# Patient Record
(demographics unavailable — no encounter records)

---

## 2025-04-03 NOTE — RISK ASSESSMENT
[Clinical Interview] : Clinical Interview [Collateral Sources] : Collateral Sources [In last 30 days] : in the last 30 days [ADHD] : ADHD [Triggering events leading to humiliation, shame, and/or despair] : triggering events leading to humiliation, shame, and/or despair (e.g. loss of relationship, financial or health status) (real or anticipated) [Identifies reasons for living] : identifies reasons for living [Supportive social network of family or friends] : supportive social network of family or friends [Ability to cope with stress] : ability to cope with stress [Responsibility to children, family, or others] : responsibility to children, family, or others [Engaged in work or school] : engaged in work or school [None in the patient's lifetime] : None in the patient's lifetime [None Known] : none known [No] : no [No known risk factors] : No known risk factors [Residential stability] : residential stability [Relationship stability] : relationship stability [Affective stability] : affective stability [Sobriety] : sobriety [Engagement in treatment] : engagement in treatment [Yes] : yes [FreeTextEntry6] : see scanned safety plan.  [de-identified] : no access to either reported.

## 2025-04-03 NOTE — RISK ASSESSMENT
[Clinical Interview] : Clinical Interview [Collateral Sources] : Collateral Sources [In last 30 days] : in the last 30 days [ADHD] : ADHD [Triggering events leading to humiliation, shame, and/or despair] : triggering events leading to humiliation, shame, and/or despair (e.g. loss of relationship, financial or health status) (real or anticipated) [Identifies reasons for living] : identifies reasons for living [Supportive social network of family or friends] : supportive social network of family or friends [Ability to cope with stress] : ability to cope with stress [Responsibility to children, family, or others] : responsibility to children, family, or others [Engaged in work or school] : engaged in work or school [None in the patient's lifetime] : None in the patient's lifetime [None Known] : none known [No] : no [No known risk factors] : No known risk factors [Residential stability] : residential stability [Relationship stability] : relationship stability [Affective stability] : affective stability [Sobriety] : sobriety [Engagement in treatment] : engagement in treatment [Yes] : yes [FreeTextEntry6] : see scanned safety plan.  [de-identified] : no access to either reported.

## 2025-04-03 NOTE — DISCUSSION/SUMMARY
[Low acute suicide risk] : Low acute suicide risk [No] : No [Yes] : Safety Plan completed/updated (for individuals at risk): Yes [FreeTextEntry1] : Risk factors: recent passive suicidal thoughts, depressive symptoms, anxiety symptoms, family history of psychiatric illness.   Protective factors: female gender, domiciled with supportive family, engaged in school, no prior SA or SIB, not current si/i/p, no h/o violence, no current hi/i/p, help-seeking, motivated for outpatient treatment, future oriented with short and long term goals, barriers to suicide, no access to guns, not acutely manic or psychotic, no substance abuse, no trauma hx, no family history of suicide. [FreeTextEntry3] : see scanned safety plan.

## 2025-04-03 NOTE — HISTORY OF PRESENT ILLNESS
[Not Applicable] : Not applicable [FreeTextEntry1] : Patient is a 13 year-old male, domiciled with parents and siblings, enrolled in [employer], in the 7th grade regular education, 504, with prior psychiatric history of ASD and ADHD, currently in outpatient treatment with pediatric neurologist, no h/o psychiatric hospitalizations, no h/o of self-injury or suicide attempts, no h/o aggression/violence/legal issues, no CPS involvement, no substance use, no known trauma hx, no significant pmhx, now presenting accompanied by his father as he was self-referred for help connecting to outpatient therapy services.   Patient presented as calm and cooperative while maintaining direct eye contact and presenting with an overall euthymic mood. Patient endorsed coming to [facility] after he shared with his parents the previous night that he was experiencing passive suicidal ideation. Patient reported that experiencing ideations have only ever occurred on a few occasions with the thoughts never being at a specific or active level. Patient reported that that worst of his thoughts includes not feeling enough for others or wanting to escape stressors. Patient endorsed that school stressors specifically have caused this most recent episode as he has been struggling to maintain the workload. Patient also endorsed inconsistent compliance with his medication regimen which he endorsed noticing a pattern that thoughts sometimes get worse when he is off his regimen. Patient endorsed wanting to speak with an outpatient therapist moving forward in addition to being more compliant with his medication regimen. Safety plan completed with patient using the [facility] Safety Plan", a copy was provided to the patient and encouraged to put it in an easily accessible place i.e. on a phone or bedside table.  The Safety Plan is a best practice recommendation by the Suicide Prevention Resource [facility].  The family was advised to call 911 or take the patient to the nearest ER if patient's behavior worsens or if any safety concerns arise.  Patient's father provided collateral. School consent was obtained and contacted regarding the outcome of today's evaluation. Father endorsed the patient's report above, reporting that the patient's current medication regimen of Zoloft and Methylphenidate is prescribed by his pediatric neurologist. Father was in agreement, stating that he needs to keep up with the patient's regimen as well so that he can remain compliant consistently. Father shared an extensive mental health family history and thus, understands the importance of compliance and pursuing additional services that might be needed. Father verbalized that the patient could benefit from utilizing more effective coping mechanisms and using a therapeutic outlet to reduce stress levels pertaining to academics and performance. He reports the patient's mood is relatively stable as evidenced by most being content/neutral despite being irritable when his stressors are high. Father denies any aggression or oppositional behavior. Father denies any symptoms of wilner or psychosis. He denied any current substance usage in the home. He denied any acute safety concerns at this time and denied any present concerns with the patient as it comes to SI/HI. [FreeTextEntry2] : ADHD, ASD.  [FreeTextEntry3] : Patient is currently prescribed Zoloft and Methylphenidate through his pediatric neurologist.

## 2025-04-03 NOTE — HISTORY OF PRESENT ILLNESS
[Not Applicable] : Not applicable [FreeTextEntry1] : Patient is a 13 year-old male, domiciled with parents and siblings, enrolled in [employer], in the 7th grade regular education, 504, with prior psychiatric history of ASD and ADHD, currently in outpatient treatment with pediatric neurologist, no h/o psychiatric hospitalizations, no h/o of self-injury or suicide attempts, no h/o aggression/violence/legal issues, no CPS involvement, no substance use, no known trauma hx, no significant pmhx, now presenting accompanied by his father as he was self-referred for help connecting to outpatient therapy services.   Patient presented as calm and cooperative while maintaining direct eye contact and presenting with an overall euthymic mood. Patient endorsed coming to [facility] after he shared with his parents the previous night that he was experiencing passive suicidal ideation. Patient reported that experiencing ideations have only ever occurred on a few occasions with the thoughts never being at a specific or active level. Patient reported that that worst of his thoughts includes not feeling enough for others or wanting to escape stressors. Patient endorsed that school stressors specifically have caused this most recent episode as he has been struggling to maintain the workload. Patient also endorsed inconsistent compliance with his medication regimen which he endorsed noticing a pattern that thoughts sometimes get worse when he is off his regimen. Patient endorsed wanting to speak with an outpatient therapist moving forward in addition to being more compliant with his medication regimen. Safety plan completed with patient using the [facility] Safety Plan", a copy was provided to the patient and encouraged to put it in an easily accessible place i.e. on a phone or bedside table.  The Safety Plan is a best practice recommendation by the [facility].  The family was advised to call 911 or take the patient to the nearest ER if patient's behavior worsens or if any safety concerns arise.  Patient's father provided collateral. School consent was obtained and contacted regarding the outcome of today's evaluation. Father endorsed the patient's report above, reporting that the patient's current medication regimen of Zoloft and Methylphenidate is prescribed by his pediatric neurologist. Father was in agreement, stating that he needs to keep up with the patient's regimen as well so that he can remain compliant consistently. Father shared an extensive mental health family history and thus, understands the importance of compliance and pursuing additional services that might be needed. Father verbalized that the patient could benefit from utilizing more effective coping mechanisms and using a therapeutic outlet to reduce stress levels pertaining to academics and performance. He reports the patient's mood is relatively stable as evidenced by most being content/neutral despite being irritable when his stressors are high. Father denies any aggression or oppositional behavior. Father denies any symptoms of wilner or psychosis. He denied any current substance usage in the home. He denied any acute safety concerns at this time and denied any present concerns with the patient as it comes to SI/HI. [FreeTextEntry2] : ADHD, ASD.  [FreeTextEntry3] : Patient is currently prescribed Zoloft and Methylphenidate through his pediatric neurologist.

## 2025-04-03 NOTE — RISK ASSESSMENT
[Clinical Interview] : Clinical Interview [Collateral Sources] : Collateral Sources [In last 30 days] : in the last 30 days [ADHD] : ADHD [Triggering events leading to humiliation, shame, and/or despair] : triggering events leading to humiliation, shame, and/or despair (e.g. loss of relationship, financial or health status) (real or anticipated) [Identifies reasons for living] : identifies reasons for living [Supportive social network of family or friends] : supportive social network of family or friends [Ability to cope with stress] : ability to cope with stress [Responsibility to children, family, or others] : responsibility to children, family, or others [Engaged in work or school] : engaged in work or school [None in the patient's lifetime] : None in the patient's lifetime [None Known] : none known [No] : no [No known risk factors] : No known risk factors [Residential stability] : residential stability [Relationship stability] : relationship stability [Affective stability] : affective stability [Sobriety] : sobriety [Engagement in treatment] : engagement in treatment [Yes] : yes [FreeTextEntry6] : see scanned safety plan.  [de-identified] : no access to either reported.

## 2025-04-03 NOTE — REASON FOR VISIT
[Behavioral Health Urgent Care Assessment] : a behavioral health urgent care assessment [Patient] : patient [Self] : alone [Father] : with father [TextBox_17] : for help connecting to outpatient therapy services.

## 2025-04-03 NOTE — HISTORY OF PRESENT ILLNESS
[Not Applicable] : Not applicable [FreeTextEntry1] : Patient is a 13 year-old male, domiciled with parents and siblings, enrolled in [employer], in the 7th grade regular education, 504, with prior psychiatric history of ASD and ADHD, currently in outpatient treatment with pediatric neurologist, no h/o psychiatric hospitalizations, no h/o of self-injury or suicide attempts, no h/o aggression/violence/legal issues, no CPS involvement, no substance use, no known trauma hx, no significant pmhx, now presenting accompanied by his father as he was self-referred for help connecting to outpatient therapy services.   Patient presented as calm and cooperative while maintaining direct eye contact and presenting with an overall euthymic mood. Patient endorsed coming to [facility] after he shared with his parents the previous night that he was experiencing passive suicidal ideation. Patient reported that experiencing ideations have only ever occurred on a few occasions with the thoughts never being at a specific or active level. Patient reported that that worst of his thoughts includes not feeling enough for others or wanting to escape stressors. Patient endorsed that school stressors specifically have caused this most recent episode as he has been struggling to maintain the workload. Patient also endorsed inconsistent compliance with his medication regimen which he endorsed noticing a pattern that thoughts sometimes get worse when he is off his regimen. Patient endorsed wanting to speak with an outpatient therapist moving forward in addition to being more compliant with his medication regimen. Safety plan completed with patient using the [facility] Safety Plan", a copy was provided to the patient and encouraged to put it in an easily accessible place i.e. on a phone or bedside table.  The Safety Plan is a best practice recommendation by the Suicide Prevention Resource [facility].  The family was advised to call 911 or take the patient to the nearest ER if patient's behavior worsens or if any safety concerns arise.  Patient's father provided collateral. School consent was obtained and contacted regarding the outcome of today's evaluation. Father endorsed the patient's report above, reporting that the patient's current medication regimen of Zoloft and Methylphenidate is prescribed by his pediatric neurologist. Father was in agreement, stating that he needs to keep up with the patient's regimen as well so that he can remain compliant consistently. Father shared an extensive mental health family history and thus, understands the importance of compliance and pursuing additional services that might be needed. Father verbalized that the patient could benefit from utilizing more effective coping mechanisms and using a therapeutic outlet to reduce stress levels pertaining to academics and performance. He reports the patient's mood is relatively stable as evidenced by most being content/neutral despite being irritable when his stressors are high. Father denies any aggression or oppositional behavior. Father denies any symptoms of wilner or psychosis. He denied any current substance usage in the home. He denied any acute safety concerns at this time and denied any present concerns with the patient as it comes to SI/HI. [FreeTextEntry2] : ADHD, ASD.  [FreeTextEntry3] : Patient is currently prescribed Zoloft and Methylphenidate through his pediatric neurologist.

## 2025-04-03 NOTE — PLAN
[Provision of National Suicide Prevention Lifeline 3-867-047-TALK (3094)] : Provision of national suicide prevention lifeline 8-918-001-talk (6783) [Patient] : patient [Family] : family [Contact was Attempted] : contact was attempted [TextBox_9] : Referral to individual therapy.  [TextBox_13] : Safety plan completed with patient using the [facility] Safety Plan."  The Safety Plan is a best practice recommendation by the Suicide Prevention Resource Center.  Safety planning reviewed with patient & family. Advised to secure all potentially dangerous items from home, including but not limited to sharp objects, weapons, prescription and non-prescription medications, and other lethal means out of patient's reach. They deny having any firearms at home. Parent agreed. Parent and patient advised to visit the nearest ED or call 911 for any worsening symptoms or if safety concerns arise. 1800-LIFENET provided. All involved verbalized understanding. [TextBox_26] : school consent provided.

## 2025-04-03 NOTE — PLAN
[Provision of National Suicide Prevention Lifeline 5-376-155-TALK (8279)] : Provision of national suicide prevention lifeline 1-398-143-talk (5027) [Patient] : patient [Family] : family [Contact was Attempted] : contact was attempted [TextBox_9] : Referral to individual therapy.  [TextBox_13] : Safety plan completed with patient using the [facility] Safety Plan."  The Safety Plan is a best practice recommendation by the Suicide Prevention Resource [facility].  Safety planning reviewed with patient & family. Advised to secure all potentially dangerous items from home, including but not limited to sharp objects, weapons, prescription and non-prescription medications, and other lethal means out of patient's reach. They deny having any firearms at home. Parent agreed. Parent and patient advised to visit the nearest ED or call 911 for any worsening symptoms or if safety concerns arise. 1800-LIFENET provided. All involved verbalized understanding. [TextBox_26] : school consent provided.

## 2025-04-03 NOTE — PLAN
[Provision of National Suicide Prevention Lifeline 4-238-770-TALK (8214)] : Provision of national suicide prevention lifeline 2-564-447-talk (6550) [Patient] : patient [Family] : family [Contact was Attempted] : contact was attempted [TextBox_9] : Referral to individual therapy.  [TextBox_13] : Safety plan completed with patient using the [facility] Safety Plan."  The Safety Plan is a best practice recommendation by the Suicide Prevention Resource Center.  Safety planning reviewed with patient & family. Advised to secure all potentially dangerous items from home, including but not limited to sharp objects, weapons, prescription and non-prescription medications, and other lethal means out of patient's reach. They deny having any firearms at home. Parent agreed. Parent and patient advised to visit the nearest ED or call 911 for any worsening symptoms or if safety concerns arise. 1800-LIFENET provided. All involved verbalized understanding. [TextBox_26] : school consent provided.